# Patient Record
Sex: FEMALE | Race: WHITE | URBAN - NONMETROPOLITAN AREA
[De-identification: names, ages, dates, MRNs, and addresses within clinical notes are randomized per-mention and may not be internally consistent; named-entity substitution may affect disease eponyms.]

---
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
## 2019-06-28 ENCOUNTER — IMPORTED ENCOUNTER (OUTPATIENT)
Dept: URBAN - NONMETROPOLITAN AREA CLINIC 1 | Facility: CLINIC | Age: 19
End: 2019-06-28

## 2019-06-28 PROBLEM — H52.13: Noted: 2019-06-28

## 2019-06-28 PROBLEM — H52.222: Noted: 2019-06-28

## 2019-06-28 PROCEDURE — S0620 ROUTINE OPHTHALMOLOGICAL EXA: HCPCS

## 2019-06-28 NOTE — PATIENT DISCUSSION
Myopia-Discussed diagnosis with patient. -Explained that people who are myopic are at a higher risk for developing RD/RT and reviewed associated S&S.-Pt to contact our office if symptoms develop. Astigmatism-Discussed diagnosis with patient. Updated spec Rx given. Recommend lens that will provide comfort as well as protect safety and health of eyes. RTC 1 Yr CEE

## 2022-04-09 ASSESSMENT — VISUAL ACUITY
OU_CC: 20/20
OS_CC: 20/30-1
OD_CC: 20/30-2

## 2022-04-09 ASSESSMENT — TONOMETRY
OD_IOP_MMHG: 14
OS_IOP_MMHG: 16